# Patient Record
Sex: FEMALE | Race: WHITE | Employment: OTHER | URBAN - METROPOLITAN AREA
[De-identification: names, ages, dates, MRNs, and addresses within clinical notes are randomized per-mention and may not be internally consistent; named-entity substitution may affect disease eponyms.]

---

## 2017-01-02 ENCOUNTER — HOSPITAL ENCOUNTER (OUTPATIENT)
Dept: CARDIAC REHAB | Facility: CLINIC | Age: 67
End: 2017-01-02
Attending: INTERNAL MEDICINE
Payer: MEDICARE

## 2017-01-02 VITALS — WEIGHT: 183 LBS | BODY MASS INDEX: 33.46 KG/M2

## 2017-01-02 PROCEDURE — G0237 THERAPEUTIC PROCD STRG ENDUR: HCPCS

## 2017-01-02 PROCEDURE — G0238 OTH RESP PROC, INDIV: HCPCS

## 2017-01-02 PROCEDURE — 40000244 ZZH STATISTIC VISIT PULM REHAB

## 2017-01-05 ENCOUNTER — HOSPITAL ENCOUNTER (OUTPATIENT)
Dept: CARDIAC REHAB | Facility: CLINIC | Age: 67
End: 2017-01-05
Attending: INTERNAL MEDICINE
Payer: MEDICARE

## 2017-01-05 VITALS — BODY MASS INDEX: 33.28 KG/M2 | WEIGHT: 182 LBS

## 2017-01-05 PROCEDURE — 40000244 ZZH STATISTIC VISIT PULM REHAB

## 2017-01-05 PROCEDURE — G0239 OTH RESP PROC, GROUP: HCPCS

## 2017-01-12 ENCOUNTER — HOSPITAL ENCOUNTER (OUTPATIENT)
Dept: CARDIAC REHAB | Facility: CLINIC | Age: 67
End: 2017-01-12
Attending: INTERNAL MEDICINE
Payer: MEDICARE

## 2017-01-12 VITALS — BODY MASS INDEX: 33.35 KG/M2 | WEIGHT: 182.4 LBS

## 2017-01-12 PROCEDURE — 40000244 ZZH STATISTIC VISIT PULM REHAB

## 2017-01-12 PROCEDURE — G0239 OTH RESP PROC, GROUP: HCPCS

## 2017-01-16 ENCOUNTER — HOSPITAL ENCOUNTER (OUTPATIENT)
Dept: CARDIAC REHAB | Facility: CLINIC | Age: 67
End: 2017-01-16
Attending: INTERNAL MEDICINE
Payer: MEDICARE

## 2017-01-16 VITALS — HEIGHT: 62 IN | WEIGHT: 184 LBS | BODY MASS INDEX: 33.86 KG/M2

## 2017-01-16 PROCEDURE — 40000244 ZZH STATISTIC VISIT PULM REHAB

## 2017-01-16 PROCEDURE — G0239 OTH RESP PROC, GROUP: HCPCS

## 2017-01-16 ASSESSMENT — PULMONARY FUNCTION TESTS
FVC_PERCENT_PREDICTED: 45
FEV1 (%PREDICTED): 50
FVC: 1.24
FEV1: 1.04
FEV1/FVC: 76

## 2017-01-16 ASSESSMENT — DUKE ACTIVITY SCORE INDEX (DASI)
DASI METS SCORE: 3.63
VO2_PEAK: 12.7

## 2017-01-16 ASSESSMENT — 6 MINUTE WALK TEST (6MWT)
MALE CALC: 394.72
TOTAL DISTANCE WALKED: 122.56
GENDER SELECTION: FEMALE
FEMALE CALC: 414.76

## 2017-01-16 NOTE — PROGRESS NOTES
Randa Vazquez  66 year old  1950  Physician cosignature/electronic signature indicates approval of this ITP document. I have established, reviewed and made necessary changes to the individualized treatment plan and exercise prescription for this patient. 01/16/17 1500   Session   Session 60 Day Individualized Treatment Plan   Certified through this date 03/04/17   Type Reassessment   General Information   Treatment Diagnosis Idiopathic Pulmonary Fibrosis   Secondary Treatment Diagnosis COPD   Classification of COPD Stage 2 (Moderate)   Onset Date 08/01/09   Current Signs and Symptoms SOB;Fatigue   Outpatient Pulmonary Rehab Start Date 11/29/16   Primary Physician Bailey Jacobsen MD   Pulmonolgist [facility]   Medical History/Comorbidities   Medical History/Comorbidities Obstructive sleep apnea   Medical History Comments Past Medical History   Diagnosis Date     Pulmonary fibrosis (H)      Arthritis      Breast cancer (H)      hx;lumpectomy/radiation     Knee pain, right 05/2013     right knee arthroplasty related to arthritis     Obesity      Oxygen dependent      IPF vs radiation lung injury w/breast CA     Hypothyroidism      STANLEY (obstructive sleep apnea)      on CPAP      Sputum   Sputum Production Amount none   Tobacco History   Tobacco Former smoker   Tobacco Habit Cigarettes   Years Smoked 22   Average Packs Per Day 1   Quit Date or Planned Quit Date 08/01/90   Interventions Planned None: Patient is in maintenance   Medications   Long-Acting Beta Agonist Not prescribed   Short-Acting Beta Agonist Not prescribed   Long-Acting Anticholinergic Not prescribed   Short-Acting Anticholinergic Not prescribed   Inhaled Corticosteroid Not prescribed   Oral Corticosteroid Not prescribed   Medications Reconciled By Patient;Medical record   Preventative Vaccinations   Influenza Vaccination Yes   Pneumonia Vaccination Yes   Pain   Patient Currently in Pain No   Falls Screen   Have you fallen two or more times in  "the past year? No   Have you fallen and had an injury in the past year? No   Living and Work Status   Living Arrangements [facility] Live with an adult   Environmental Factors No concerns   ADL Limitations Bathing;Stair climbing   Initial Duke Activity Status Index (DASI) score. A measure of functional capacity. The goal is to have a pre-program raw score of 9.95 (~4 METs) or above 7.2   Initial DASI VO2 Peak (ml*kg-1*min-1) 12.7   Initial DASI MET Level 3.63   Occupation Retired   Return to Employment No   Physical Assessments   Edema None   Left Lung Sounds crackles   Right Lung Sounds crackles   Pulmonary Function Test (PFTs)   PFT Results Available   Date Completed 11/01/16   FVC Actual 1.24   % Predicted FVC 45   FEV1 Actual 1.04   % Predicted FEV1 50   FEV1/FEV Ratio 76   FRC Actual 84   Uncorrected DLCO 3.6   % Predicted Uncorrected DLCO 17   Pre/Post Bronchodilator Pre   Individualized Treatment Plan   Sessions Scheduled 18   Sessions Attended 11   Type Aerobic exercise;Resistance training   Oxygen Use   Supplemental Oxygen Needed Yes   Delivery Device Nasal Cannula   Liter Flow at Rest 4   Liter Flow with ADLS 5-6   Liter Flow During Exercise 8-10   Liter Flow With Sleep 4  (with CPAP)   Evaluated on Home System? (has the michael)   Interventions Recommended Attend education on home oxygen   Interventions Completed (has not attended when offered)   Exercise Prescription   Mode Treadmill;Nustep;Weights;Ambulation   Frequency 2 days/week   Duration/Time Intermittent bouts   THR (85% of age predicted max heart rate)  129.2   Effort Rating (0-10) 4-7/10   Progression of Exercise Start with bouts to 15\" then add 0.25 mets/week   Oxygen Titration with Exercise > 88% with exercise   Exercise Assessment   6 Minute Walk Predicted - Gender Selection Female   6 Minute Walk Predicted (Female) 414.76   6 Minute Walk Predicted (Male) 394.72   6 Minute Walk Distance (Initial) 122.56 Meters   Resting HR 92 " "  Resting /80   Exercise /85   SpO2 96   Exercise SpO2 77   Exercise Tolerance poor   Normal Limits Discussed Yes   Current Symptoms at Home Fatigue;Dyspnea   Current Symptoms in Rehab Dyspnea;Fatigue;Weakness   Limitations SOB. desats   Nutrition Management   Age 66   Height 1.575 m (5' 2.01\")   Weight 83.462 kg (184 lb)   BMI (Calculated) 33.72   Assessment Overweight   Goal weight (Pt has lost 30# in the last year. Pt happy at maintaining.)   Interventions Planned Attend group nutrition class   Interventions Completed (Pt did not attend education when offered.)   Psychosocial   Initial Patient Health Questionnaire -9 (PHQ-9) for depression. To notify physician if pre-score >9. 8   Initial [facility] Survey score. A quality of life measure. To re evaluate if total score is > 25. Also consider PHQ-9 and DASI to determine if patient should be referred back to physician. 29   Initial Shortness of Breath Questionnaire (SOBQ) score. The goal is to reduce the score pre to post program. 85   Intervention Planned Patient to identify 2-3 coping mechanisms to decrease stress and anxiety;Patient to attend appropriate education class;Develop and implement coping techniques;Use breathing techniques to control dyspnea cycle;Introduce relaxation techniques to assist with decreased anxiety;Recognize signs and symptoms of depression   Interventions Completed Demonstrated ability to apply breathing techniques to control dyspnea cycle   Psychosocial Comments 12/20/16 Pt is feeling good during this X mass season. 1/16/17 no changes . Coping as well as can be expected.   Stages of Change   Aerobic Exercise Contemplation   Physical Activity Contemplation   Recommended diet Action   Stress Preparation   Smoking Cessation Maintenance   Oxygen Usage Maintenance   Current Home Exercise   Type of Exercise None  (suggested chair aerobics DVD)   Recommended Home Exercise Prescription   Type of Exercise Walking;Mall walking "   Frequency (Days per week) 1-2   Duration (minutes per session) 15-30 min   Effort Rating Recommended (0-10) Scale  4-6/10   30 Day Exercise Plan 12/20/16 Encouraged pt to start chair exercises at home.Possibly purchase a DVD and hand wts. 1/16/17  Made same suggestion to get DVD. Pt reacted as if first time suggested.    Learning Assessment   Learner Patient   Primary Language English   Preferred Learning Style Listening;Reading;Demonstration   Barriers to Learning No barriers noted   Patient Education/Referrals   Education Recommended Exercise Principles;Home Oxygen Equipment;Energy Conservation;Anatomy and Disease Process;Breathing Techniques;Patient already attended in the past;Activities of Daily Living   Education Attended Energy Conservation;Breathing Techniques;Activities of Daily Living;Patient already attended in the past   Referral(s) Recommended None   Follow-up/On-going Support   Provider follow-up needed on the following No follow-up needed   Pulmonary Rehab Goals   Pulmonary Rehab Goals 1;2;3   Goal 1   Goal Pt to walk down her hallway in the house without holding onto the wall as she walks by starting an exercise program to gain strength and endurance .   Target Date 01/01/17   Progress Towards Goal 12/20/16 No change in behavior yet. Will do a TUG test to rule out balance issues 1/16/17 Pt still has same behavior but now has given more thought to attending the balance clinic in the future.   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   Target Date 01/01/17   Progress Towards Goal 12/20/16 Discussed energy conservation and PLB today.1/16/17 Review of PLB and practice of abdominal breathing and energy conservation. Gave handout on ADL's.   Goal 3   Goal Pt to walk to [facility] from car with less rest stops.   Target Date 02/02/17   Progress Towards Goal 12/20/16 Pt stopping 3 times on her way  in.1/16/17 Now at 2 rest stops.    Assessment   Assessment Pt is a 65 yo who has IPF since 2009. She was all set to start [place] in July when she was hospitalized for pneumonia for 4 days. She is now back and wants to gain stamina for better walking and easier ADL's. She is deconditioned and does not exercise at all. She has done NY in the past and would like to re learn the breathing techniques to help in daily life. 12/20/16 Pt has not started any home exercise. Encouraged her to do her clinic walk first thing on arrival but so far she has not. We have been turning the O2 up to 6-8 lpm oximizer for sessions. Her walking needs better pacing and I have demonstrated to her strategies to help with desaturation and fatigue. She has lost 3# since starting NY and she remains on the IPF drug OFEV.Will do a TUG test for balance next session.Pt would like to start chair exercises but does not have a good plan. Encouraged her to purchase a DVD.1/16/17 Updated ITP today. TUG test 17.7 seconds. Pt did not want the referral to the balance clinic but will keep it in mind. She has not purchased  or downloaded chair aerobics. She does not exercise at home. She has an alert button now in case of a fall. Went on a prednisone burst 2 weeks ago and  she states it didn't help her. We have been using 8-10 LPM with an oximyzer swapna for exercise. Will do a desat walk test closer to discharge and fax to Dr Mathew. Continue skilled care to monitor sat and increase intensity of workouts and continue to encourage a home exercise program.

## 2017-01-19 ENCOUNTER — HOSPITAL ENCOUNTER (OUTPATIENT)
Dept: CARDIAC REHAB | Facility: CLINIC | Age: 67
End: 2017-01-19
Attending: INTERNAL MEDICINE
Payer: MEDICARE

## 2017-01-19 VITALS — WEIGHT: 182.8 LBS | BODY MASS INDEX: 33.43 KG/M2

## 2017-01-19 PROCEDURE — G0239 OTH RESP PROC, GROUP: HCPCS

## 2017-01-19 PROCEDURE — 40000244 ZZH STATISTIC VISIT PULM REHAB

## 2017-01-23 ENCOUNTER — HOSPITAL ENCOUNTER (OUTPATIENT)
Dept: CARDIAC REHAB | Facility: CLINIC | Age: 67
End: 2017-01-23
Attending: INTERNAL MEDICINE
Payer: MEDICARE

## 2017-01-23 VITALS — BODY MASS INDEX: 33.39 KG/M2 | WEIGHT: 182.6 LBS

## 2017-01-23 PROCEDURE — G0239 OTH RESP PROC, GROUP: HCPCS

## 2017-01-23 PROCEDURE — 40000244 ZZH STATISTIC VISIT PULM REHAB

## 2017-01-26 ENCOUNTER — HOSPITAL ENCOUNTER (OUTPATIENT)
Dept: CARDIAC REHAB | Facility: CLINIC | Age: 67
End: 2017-01-26
Attending: INTERNAL MEDICINE
Payer: MEDICARE

## 2017-01-26 VITALS — WEIGHT: 182.6 LBS | BODY MASS INDEX: 33.39 KG/M2

## 2017-01-26 PROCEDURE — 40000244 ZZH STATISTIC VISIT PULM REHAB

## 2017-01-26 PROCEDURE — G0239 OTH RESP PROC, GROUP: HCPCS

## 2017-01-30 ENCOUNTER — HOSPITAL ENCOUNTER (OUTPATIENT)
Dept: CARDIAC REHAB | Facility: CLINIC | Age: 67
End: 2017-01-30
Attending: INTERNAL MEDICINE
Payer: MEDICARE

## 2017-01-30 VITALS — WEIGHT: 182.4 LBS | BODY MASS INDEX: 33.35 KG/M2

## 2017-01-30 PROCEDURE — 40000244 ZZH STATISTIC VISIT PULM REHAB

## 2017-01-30 PROCEDURE — G0239 OTH RESP PROC, GROUP: HCPCS

## 2017-02-06 ENCOUNTER — HOSPITAL ENCOUNTER (OUTPATIENT)
Dept: CARDIAC REHAB | Facility: CLINIC | Age: 67
End: 2017-02-06
Attending: INTERNAL MEDICINE
Payer: MEDICARE

## 2017-02-06 VITALS — BODY MASS INDEX: 33.21 KG/M2 | WEIGHT: 181.6 LBS

## 2017-02-06 PROCEDURE — G0239 OTH RESP PROC, GROUP: HCPCS

## 2017-02-06 PROCEDURE — 40000244 ZZH STATISTIC VISIT PULM REHAB

## 2017-02-13 ENCOUNTER — HOSPITAL ENCOUNTER (OUTPATIENT)
Dept: CARDIAC REHAB | Facility: CLINIC | Age: 67
End: 2017-02-13
Attending: INTERNAL MEDICINE
Payer: MEDICARE

## 2017-02-13 VITALS — WEIGHT: 181.4 LBS | BODY MASS INDEX: 33.17 KG/M2

## 2017-02-13 PROCEDURE — 40000244 ZZH STATISTIC VISIT PULM REHAB

## 2017-02-13 PROCEDURE — G0239 OTH RESP PROC, GROUP: HCPCS

## 2017-02-20 ENCOUNTER — HOSPITAL ENCOUNTER (OUTPATIENT)
Dept: CARDIAC REHAB | Facility: CLINIC | Age: 67
End: 2017-02-20
Attending: INTERNAL MEDICINE
Payer: MEDICARE

## 2017-02-20 VITALS — BODY MASS INDEX: 33.42 KG/M2 | HEIGHT: 62 IN | WEIGHT: 181.6 LBS

## 2017-02-20 PROCEDURE — G0238 OTH RESP PROC, INDIV: HCPCS

## 2017-02-20 PROCEDURE — 40000244 ZZH STATISTIC VISIT PULM REHAB

## 2017-02-20 ASSESSMENT — DUKE ACTIVITY SCORE INDEX (DASI)
DASI METS SCORE: 3.63
VO2_PEAK: 12.7
VO2_PEAK: 12.7
TOTAL_SCORE: 7.2
DASI METS SCORE: 3.63

## 2017-02-20 ASSESSMENT — 6 MINUTE WALK TEST (6MWT)
FEMALE CALC: 417.21
GENDER SELECTION: FEMALE
MALE CALC: 396.48
TOTAL DISTANCE WALKED: 122.56
TOTAL DISTANCE WALKED: 125

## 2017-02-20 ASSESSMENT — PULMONARY FUNCTION TESTS
FEV1 (%PREDICTED): 50
FVC_PERCENT_PREDICTED: 45
FVC: 1.24
FEV1: 1.04
FEV1/FVC: 76

## 2017-02-20 NOTE — PROGRESS NOTES
Randacheryl Vazquez  66 year old  1950  I have reviewed this patient s outcomes and self report of symptoms.  The patient has completed Pulmonary Rehabilitation and has made appropriate progress towards goals.  Please see individual treatment plan for details of attainment, discharge plan and independent exercise prescription. 02/20/17 1300   Session   Session Discharge Note   Type Discharge/Follow-up   General Information   Treatment Diagnosis Idiopathic Pulmonary Fibrosis   Secondary Treatment Diagnosis COPD   Classification of COPD Stage 2 (Moderate)   Onset Date 08/01/09   Current Signs and Symptoms SOB;Fatigue;Appetitie   Outpatient Pulmonary Rehab Start Date 11/29/16   Primary Physician Bailey Jacobsen MD   Pulmonolgist [facility]   Medical History/Comorbidities   Medical History/Comorbidities Obstructive sleep apnea   Medical History Comments Past Medical History   Diagnosis Date     Arthritis      Breast cancer (H)      hx;lumpectomy/radiation     Hypothyroidism      Knee pain, right 05/2013     right knee arthroplasty related to arthritis     Obesity      STANLEY (obstructive sleep apnea)      on CPAP     Oxygen dependent      IPF vs radiation lung injury w/breast CA     Pulmonary fibrosis (H)       Sputum   Sputum Production Amount none   Tobacco History   Tobacco Former smoker   Tobacco Habit Cigarettes   Years Smoked 22   Average Packs Per Day 1   Quit Date or Planned Quit Date 08/01/90   Interventions Planned None: Patient is in maintenance   Medications   Long-Acting Beta Agonist Not prescribed   Short-Acting Beta Agonist Not prescribed   Long-Acting Anticholinergic Not prescribed   Short-Acting Anticholinergic Not prescribed   Inhaled Corticosteroid Not prescribed   Oral Corticosteroid Not prescribed   Medications Reconciled By Patient;Medical record   Preventative Vaccinations   Influenza Vaccination Yes   Pneumonia Vaccination Yes   Pain   Patient Currently in Pain No   Falls Screen   Have you  "fallen two or more times in the past year? No   Have you fallen and had an injury in the past year? No   Living and Work Status   Living Arrangements [facility] Live with an adult   Environmental Factors No concerns   ADL Limitations Bathing;Stair climbing   Initial Duke Activity Status Index (DASI) score. A measure of functional capacity. The goal is to have a pre-program raw score of 9.95 (~4 METs) or above 7.2   Initial DASI VO2 Peak (ml*kg-1*min-1) 12.7   Initial DASI MET Level 3.63   Discharge DASI score 7.2   Discharge DASI VO2 Peak 12.7   Discharge DASI MET Level 3.63   Occupation Retired   Return to Employment No   Physical Assessments   Edema None   Left Lung Sounds crackles   Right Lung Sounds crackles   Pulmonary Function Test (PFTs)   PFT Results Available   Date Completed 11/01/16   FVC Actual 1.24   % Predicted FVC 45   FEV1 Actual 1.04   % Predicted FEV1 50   FEV1/FEV Ratio 76   FRC Actual 84   Uncorrected DLCO 3.6   % Predicted Uncorrected DLCO 17   Pre/Post Bronchodilator Pre   Individualized Treatment Plan   Sessions Scheduled 18   Sessions Attended 18   Type Aerobic exercise;Resistance training   Oxygen Use   Supplemental Oxygen Needed Yes   Delivery Device Nasal Cannula   Liter Flow at Rest 4   Liter Flow with ADLS 5-6   Liter Flow During Exercise 8-10   Liter Flow With Sleep 4  (with CPAP)   Evaluated on Home System? (has the marathon)   Interventions Recommended Attend education on home oxygen   Interventions Completed (has not attended when offered)   Exercise Prescription   Mode Treadmill;Nustep;Weights;Ambulation   Frequency 2 days/week   Duration/Time Intermittent bouts   THR (85% of age predicted max heart rate)  129.2   Effort Rating (0-10) 4-7/10   Progression of Exercise Start with bouts to 15\" then add 0.25 mets/week   Oxygen Titration with Exercise > 88% with exercise   Exercise Assessment   6 Minute Walk Predicted - Gender Selection Female   6 Minute Walk Predicted " "(Female) 417.21   6 Minute Walk Predicted (Male) 396.48   6 Minute Walk Distance (Initial) 122.56 Meters   6-min Walk Distance (Discharge) 125 Meters   Resting HR 75   Exercise    Resting /80   Exercise /78   SpO2 98   Exercise SpO2 84   Current MET level 2.8 mets   Exercise Tolerance poor   Normal Limits Discussed Yes   Current Symptoms at Home Fatigue;Dyspnea   Current Symptoms in Rehab Dyspnea;Fatigue;Weakness   Limitations SOB. desats   Nutrition Management   Age 66   Height 1.575 m (5' 2\")   Weight 82.4 kg (181 lb 9.6 oz)   BMI (Calculated) 33.28   Assessment Overweight   Goal weight (Pt has lost 30# in the last year. Pt happy at maintaining.)   Interventions Planned Attend group nutrition class   Interventions Completed (Pt did not attend education when offered.)   Psychosocial   Initial Patient Health Questionnaire -9 (PHQ-9) for depression. To notify physician if pre-score >9. 8   Initial [employer] [facility] Survey score. A quality of life measure. To re evaluate if total score is > 25. Also consider PHQ-9 and DASI to determine if patient should be referred back to physician. 29   Initial Shortness of Breath Questionnaire (SOBQ) score. The goal is to reduce the score pre to post program. 85   Discharge PHQ-9 for depression 5   Discharge [employer] Survey Score 27   Discharge SOBQ Score 72   Intervention Planned Patient to identify 2-3 coping mechanisms to decrease stress and anxiety;Patient to attend appropriate education class;Develop and implement coping techniques;Use breathing techniques to control dyspnea cycle;Introduce relaxation techniques to assist with decreased anxiety;Recognize signs and symptoms of depression   Interventions Completed Demonstrated ability to apply breathing techniques to control dyspnea cycle;Recognizes Signs and Symptoms of Depression   Psychosocial Comments 12/20/16 Pt is feeling good during this X mass season. 1/16/17 no changes . Coping as well as can be " expected.2/20/17 Some stress associated with her daughter and grandson moving out but she is enjoying when they visit.   Stages of Change   Aerobic Exercise Contemplation;Action   Physical Activity Contemplation;Action   Recommended diet Action   Stress Preparation;Maintenance   Smoking Cessation Maintenance   Oxygen Usage Maintenance   Current Home Exercise   Type of Exercise None  (suggested chair aerobics DVD, has not done the recommendation)   Recommended Home Exercise Prescription   Type of Exercise Walking;Mall walking   Frequency (Days per week) 1-2   Duration (minutes per session) 15-30 min   Effort Rating Recommended (0-10) Scale  4-6/10   Recommended Exercise Heart Rate Range (not given)   30 Day Exercise Plan 12/20/16 Encor aged pt to start chair exercises at home.Possibly purchase a DVD and hand wts. 1/16/17  Made same suggestion to get DVD. Pt reacted as if first time suggested. 2/20/17 10 minutes walking in the house/   Learning Assessment   Learner Patient   Primary Language English   Preferred Learning Style Listening;Reading;Demonstration   Barriers to Learning No barriers noted   Patient Education/Referrals   Education Recommended Exercise Principles;Home Oxygen Equipment;Energy Conservation;Anatomy and Disease Process;Breathing Techniques;Patient already attended in the past;Activities of Daily Living   Education Attended Energy Conservation;Breathing Techniques;Activities of Daily Living;Patient already attended in the past   Referral(s) Recommended None   Follow-up/On-going Support   Provider follow-up needed on the following No follow-up needed   Pulmonary Rehab Goals   Pulmonary Rehab Goals 1;2;3   Goal 1   Goal Pt to walk down her hallway in the house without holding onto the wall as she walks by starting an exercise program to gain strength and endurance .   Target Date 01/01/17   Date Met 02/20/17   Progress Towards Goal 12/20/16 No change in behavior yet. Will do a TUG test to rule out  balance issues 1/16/17 Pt still has same behavior but now has given more thought to attending the balance clinic in the future.2/20/17 Pt has met this goal. She is pacing much better when walking.   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   Target Date 01/01/17   Date Met 02/20/17   Progress Towards Goal 12/20/16 Discussed energy conservation and PLB today.1/16/17 Review of PLB and practice of abdominal breathing and energy conservation. Gave handout on ADL's.2/20/17 Reviewed abdominal breathing today to be used for relaxation.   Goal 3   Goal Pt to walk to [facility] from car with less rest stops.   Target Date 02/02/17   Date Met 02/20/17   Progress Towards Goal 12/20/16 Pt stopping 3 times on her way in.1/16/17 Now at 2 rest stops. 2/20/17 Pt is pacing her walk with results.   Assessment   Assessment Pt is a 65 yo who has IPF since 2009. She was all set to start [place] in July when she was hospitalized for pneumonia for 4 days. She is now back and wants to gain stamina for better walking and easier ADL's. She is deconditioned and does not exercise at all. She has done [place] in the past and would like to re learn the breathing techniques to help in daily life. 12/20/16 Pt has not started any home exercise. Angelina aged her to do her clinic walk first thing on arrival but so far she has not. We have been turning the O2 up to 6-8 lpm oximyzer for sessions. Her walking needs better pacing and I have demonstrated to her strategies to help with desaturation and fatigue. She has lost 3# since starting GA and she remains on the IPF drug OFEV.Will do a TUG test for balance next session.Pt would like to start chair exercises but does not have a good plan. Angelina aged her to purchase a DVD.1/16/17 Updated ITP today. TUG test 17.7 seconds. Pt did not want the referral to the balance clinic but will keep it in mind. She has not  purchased  or downloaded chair aerobics. She does not exercise at home. She has an alert button now in case of a fall. Went on a prednisone burst 2 weeks ago and  she states it didn't help her. We have been using 8-10 LPM with an oximyzer swapna for exercise. Will do a desat walk test closer to discharge and fax to Dr Mathew. Romain echevarria skilled care to monitor sat and increase intensity of workouts and continue to encourage a home exercise program.2/20/17 Pt is discharging today. She feels that [place] has been helpful even though there was only minor changes in her surveys for the better. She still desaturates while hare walking but has learned to pace herself by taking frequent rest stops. She now has a high flow CONC at home to facilitate higher O2 flow when needed, but continues on her Marathon Helios for portability. Also discussed the benefits of hospice care in the future and the need for Morphine for her air hunger.

## 2017-02-27 ENCOUNTER — ONCOLOGY VISIT (OUTPATIENT)
Dept: ONCOLOGY | Facility: CLINIC | Age: 67
End: 2017-02-27
Attending: INTERNAL MEDICINE
Payer: COMMERCIAL

## 2017-02-27 VITALS
BODY MASS INDEX: 33.53 KG/M2 | WEIGHT: 182.2 LBS | DIASTOLIC BLOOD PRESSURE: 83 MMHG | SYSTOLIC BLOOD PRESSURE: 126 MMHG | OXYGEN SATURATION: 96 % | TEMPERATURE: 98.3 F | RESPIRATION RATE: 16 BRPM | HEIGHT: 62 IN | HEART RATE: 99 BPM

## 2017-02-27 DIAGNOSIS — C50.311 MALIGNANT NEOPLASM OF LOWER-INNER QUADRANT OF RIGHT FEMALE BREAST (H): ICD-10-CM

## 2017-02-27 PROCEDURE — 99214 OFFICE O/P EST MOD 30 MIN: CPT | Performed by: INTERNAL MEDICINE

## 2017-02-27 RX ORDER — LETROZOLE 2.5 MG/1
2.5 TABLET, FILM COATED ORAL DAILY
Qty: 90 TABLET | Refills: 5 | Status: CANCELLED | OUTPATIENT
Start: 2017-02-27

## 2017-02-27 ASSESSMENT — PAIN SCALES - GENERAL: PAINLEVEL: NO PAIN (0)

## 2017-02-27 NOTE — PATIENT INSTRUCTIONS
Discontinue Femara  Follow up with primary care physician, annual mammogram and annual breast exam should be done at primary care office.

## 2017-02-27 NOTE — MR AVS SNAPSHOT
After Visit Summary   2/27/2017    Randa Vazquez    MRN: [id number]           Patient Information     Date Of Birth          1950        Visit Information        Provider Department      2/27/2017 2:30 PM Hazel King MD [facility]      Today's Diagnoses     Malignant neoplasm of lower-inner quadrant of right female breast (H)          Care Instructions    Discontinue Femara  Follow up with primary care physician, annual mammogram and annual breast exam should be done at primary care office.         Follow-ups after your visit        Who to contact     If you have questions or need follow up information about today's clinic visit or your schedule please contact [facility] directly at [phone].  Normal or non-critical lab and imaging results will be communicated to you by Fusebillhart, letter or phone within 4 business days after the clinic has received the results. If you do not hear from us within 7 days, please contact the clinic through Fusebillhart or phone. If you have a critical or abnormal lab result, we will notify you by phone as soon as possible.  Submit refill requests through charity: water or call your pharmacy and they will forward the refill request to us. Please allow 3 business days for your refill to be completed.          Additional Information About Your Visit        MyChart Information     charity: water gives you secure access to your electronic health record. If you see a primary care provider, you can also send messages to your care team and make appointments. If you have questions, please call your primary care clinic.  If you do not have a primary care provider, please call [phone] and they will assist you.        Care EveryWhere ID     This is your Care EveryWhere ID. This could be used by other organizations to access your [facility] medical records  [id number]        Your Vitals Were     Pulse Temperature  "Respirations Height Last Period Pulse Oximetry    99 98.3  F (36.8  C) (Tympanic) 16 1.575 m (5' 2\") 12/01/2001 96%    BMI (Body Mass Index)                   33.32 kg/m2            Blood Pressure from Last 3 Encounters:   02/27/17 126/83   08/14/16 154/88   02/15/16 (!) 142/99    Weight from Last 3 Encounters:   02/27/17 82.6 kg (182 lb 3.2 oz)   02/20/17 82.4 kg (181 lb 9.6 oz)   02/13/17 82.3 kg (181 lb 6.4 oz)              Today, you had the following     No orders found for display       Primary Care Provider Office Phone # Fax #    Bailey Jacobsen [phone]       [facility] [address] [place] [address]        Thank you!     Thank you for choosing [facility]  for your care. Our goal is always to provide you with excellent care. Hearing back from our patients is one way we can continue to improve our services. Please take a few minutes to complete the written survey that you may receive in the mail after your visit with us. Thank you!             Your Updated Medication List - Protect others around you: Learn how to safely use, store and throw away your medicines at www.disposemymeds.org.          This list is accurate as of: 2/27/17 11:59 PM.  Always use your most recent med list.                   Brand Name Dispense Instructions for use    ADVIL PO      Take 400 mg by mouth 3 times daily as needed for moderate pain       ARMOUR THYROID 60 MG tablet   Generic drug:  thyroid      Take 60 mg by mouth daily       aspirin 81 MG tablet      Take 81 mg by mouth daily       CENTRUM SILVER per tablet      Take 1 tablet by mouth daily       FIBER PLUS CALCIUM PO      Take 2 tablets by mouth daily       FLUoxetine 20 MG capsule    PROZAC    90 capsule    Take 1 capsule by mouth every morning.       letrozole 2.5 MG tablet    FEMARA    90 tablet    Take 1 tablet (2.5 mg) by mouth daily       OFEV 150 MG capsule   Generic drug:  nintedanib "      Take 150 mg by mouth daily       other medical supplies      Oxygen 4L       TRAMADOL HCL PO      Take  mg by mouth 2 times daily as needed for moderate to severe pain

## 2017-02-27 NOTE — PROGRESS NOTES
"[facility]  HEMATOLOGY ONCOLOGY    DIAGNOSES:   1.  Right breast infiltrating ductal carcinoma, stage I, grade 1, pathological T1c N0 Mx, ER/TX positive.  HER-2 testing was not performed on diagnosis in year 2009.   2.  Persistently elevated CA27.29.   3.  Chronic obstructive pulmonary disease, dependence on oxygen.      TREATMENT:   1.  In 07/2009 abnormal screening mammogram.  Biopsy showed ductal carcinoma in situ, proceeded with a right-sided wire localization and right partial lumpectomy and right axillary lymphadenectomy and sentinel node biopsy.  The patient had microinvasive intraductal carcinoma on the right side, 1 mm.   2.  Femara since 2009.      SUBJECTIVE:  Randa Vazquez comes in for followup today. She has been feeling well. On oxygen.      REVIEW OF SYSTEMS:  A complete review of systems was performed and found to be negative other than pertinent positives mentioned in history of present illness.      PHYSICAL EXAMINATION:   VITAL SIGNS:/83 (BP Location: Right arm, Patient Position: Chair, Cuff Size: Adult Large)  Pulse 99  Temp 98.3  F (36.8  C) (Tympanic)  Resp 16  Ht 1.575 m (5' 2\")  Wt 82.6 kg (182 lb 3.2 oz)  LMP 12/01/2001  SpO2 96%  BMI 33.32 kg/m2  GENERAL:  Sitting comfortably.   NEUROLOGIC:  Alert, awake.   BREAST: Right- No mass, nipple discharge or axillary adenopathy. Left- No mass, nipple discharge or axillary adenopathy.    DATA:  Reviewed    ASSESSMENT:  A 64-year-old lady with a stage I, ER/TX positive breast cancer.  Her last mammogram is without any evidence of recurrence and her physical exam does not have any significant abnormalities.  She has persistently elevated CA27.29 and CEA, which has been stable since a long time.  She also has evidence of osteopenia and has not tolerated Zometa previously.  She is also on prednisone long-term which is likely contributing to her osteopenia.  She continues to take calcium and vitamin D.   Mammogram in " 07/2016- benign.   She expressed her desire to stop Femara. I think it would be a reasonable approach. She had stage I tumor. She has completed more than 7 years of treatment. She has osteopenia now and may need bisphosphonate.     Plan:  Discontinue Femara  Follow up with primary care physician, annual mammogram and annual breast exam should be done at primary care office.     BISHOP GAUTHIER MD    2/27/2017    cc: Bailey Jacobsen CNP

## 2017-02-27 NOTE — NURSING NOTE
"Randa Vazquez is a 66 year old female who presents for:  Chief Complaint   Patient presents with     Oncology Clinic Visit     Malignant neoplasm of lower-inner quadrant of right female breast-Follow up        Initial Vitals:  Resp 16  Ht 1.575 m (5' 2\")  Wt 82.6 kg (182 lb 3.2 oz)  LMP 12/01/2001  BMI 33.32 kg/m2 Estimated body mass index is 33.32 kg/(m^2) as calculated from the following:    Height as of this encounter: 1.575 m (5' 2\").    Weight as of this encounter: 82.6 kg (182 lb 3.2 oz).. Body surface area is 1.9 meters squared. BP completed using cuff size: large  No Pain (0) Patient's last menstrual period was 12/01/2001. Allergies and medications reviewed.     Medications: Medication refills not needed today.  Pharmacy name entered into Gazzang:    [id number] [place], [place] - [address] [facility] [place], [place] - [address]    Comments: Follow-up  8 minutes for nursing intake (face to face time)   Nina Leahy  DISCHARGE PLAN:  Next appointments: See patient instruction section  Departure Mode: Ambulatory  Accompanied by: self  0 minutes for nursing discharge (face to face time)   Nina Leahy          "

## 2017-03-15 ENCOUNTER — HOSPITAL ENCOUNTER (OUTPATIENT)
Dept: LAB | Facility: CLINIC | Age: 67
Discharge: HOME | End: 2017-03-15
Attending: INTERNAL MEDICINE | Admitting: INTERNAL MEDICINE
Payer: MEDICARE

## 2017-03-15 DIAGNOSIS — J84.112: ICD-10-CM

## 2017-03-15 LAB
ALP SERPL-CCNC: 77 U/L (ref 40–150)
ALT SERPL W P-5'-P-CCNC: 22 U/L (ref 0–50)
AST SERPL W P-5'-P-CCNC: 33 U/L (ref 0–45)
BILIRUB SERPL-MCNC: 0.6 MG/DL (ref 0.2–1.3)

## 2017-03-15 PROCEDURE — 36415 COLL VENOUS BLD VENIPUNCTURE: CPT | Performed by: INTERNAL MEDICINE

## 2017-03-15 PROCEDURE — 84075 ASSAY ALKALINE PHOSPHATASE: CPT | Performed by: INTERNAL MEDICINE

## 2017-03-15 PROCEDURE — 82247 BILIRUBIN TOTAL: CPT | Performed by: INTERNAL MEDICINE

## 2017-03-15 PROCEDURE — 84450 TRANSFERASE (AST) (SGOT): CPT | Performed by: INTERNAL MEDICINE

## 2017-03-15 PROCEDURE — 84460 ALANINE AMINO (ALT) (SGPT): CPT | Performed by: INTERNAL MEDICINE

## 2017-05-02 DIAGNOSIS — J84.112 IDIOPATHIC PULMONARY FIBROSIS (H): Primary | ICD-10-CM

## 2017-05-05 ENCOUNTER — HOSPITAL ENCOUNTER (OUTPATIENT)
Dept: CT IMAGING | Facility: CLINIC | Age: 67
Discharge: HOME | End: 2017-05-05
Attending: INTERNAL MEDICINE | Admitting: INTERNAL MEDICINE
Payer: MEDICARE

## 2017-05-05 DIAGNOSIS — J84.112 IDIOPATHIC PULMONARY FIBROSIS (H): ICD-10-CM

## 2017-05-05 PROCEDURE — 71250 CT THORAX DX C-: CPT

## 2017-06-15 ENCOUNTER — HOSPITAL ENCOUNTER (OUTPATIENT)
Dept: LAB | Facility: CLINIC | Age: 67
Discharge: HOME | End: 2017-06-15
Attending: INTERNAL MEDICINE | Admitting: INTERNAL MEDICINE
Payer: MEDICARE

## 2017-06-15 DIAGNOSIS — J84.112 IDIOPATHIC PULMONARY FIBROSIS (H): ICD-10-CM

## 2017-06-15 LAB
ALBUMIN SERPL-MCNC: 3.3 G/DL (ref 3.4–5)
ALP SERPL-CCNC: 83 U/L (ref 40–150)
ALT SERPL W P-5'-P-CCNC: 19 U/L (ref 0–50)
AST SERPL W P-5'-P-CCNC: 24 U/L (ref 0–45)
BILIRUB DIRECT SERPL-MCNC: <0.1 MG/DL (ref 0–0.2)
BILIRUB SERPL-MCNC: 0.5 MG/DL (ref 0.2–1.3)
PROT SERPL-MCNC: 7.3 G/DL (ref 6.8–8.8)

## 2017-06-15 PROCEDURE — 80076 HEPATIC FUNCTION PANEL: CPT | Performed by: INTERNAL MEDICINE

## 2017-06-15 PROCEDURE — 36415 COLL VENOUS BLD VENIPUNCTURE: CPT | Performed by: INTERNAL MEDICINE

## 2017-07-01 ENCOUNTER — HEALTH MAINTENANCE LETTER (OUTPATIENT)
Age: 67
End: 2017-07-01

## 2017-07-24 ENCOUNTER — HOSPITAL ENCOUNTER (OUTPATIENT)
Dept: MAMMOGRAPHY | Facility: CLINIC | Age: 67
Discharge: HOME | End: 2017-07-24
Attending: NURSE PRACTITIONER | Admitting: NURSE PRACTITIONER
Payer: MEDICARE

## 2017-07-24 DIAGNOSIS — Z12.31 VISIT FOR SCREENING MAMMOGRAM: ICD-10-CM

## 2017-07-24 PROCEDURE — 77063 BREAST TOMOSYNTHESIS BI: CPT

## 2017-07-24 PROCEDURE — G0202 SCR MAMMO BI INCL CAD: HCPCS

## 2017-09-18 ENCOUNTER — HOSPITAL ENCOUNTER (OUTPATIENT)
Dept: LAB | Facility: CLINIC | Age: 67
Discharge: HOME | End: 2017-09-18
Attending: INTERNAL MEDICINE | Admitting: INTERNAL MEDICINE
Payer: MEDICARE

## 2017-09-18 DIAGNOSIS — J84.112 IDIOPATHIC PULMONARY FIBROSIS (H): Primary | ICD-10-CM

## 2017-09-18 LAB
ALBUMIN SERPL-MCNC: 2.9 G/DL (ref 3.4–5)
ALP SERPL-CCNC: 78 U/L (ref 40–150)
ALT SERPL W P-5'-P-CCNC: 11 U/L (ref 0–50)
AST SERPL W P-5'-P-CCNC: 16 U/L (ref 0–45)
BILIRUB DIRECT SERPL-MCNC: <0.1 MG/DL (ref 0–0.2)
BILIRUB SERPL-MCNC: 0.3 MG/DL (ref 0.2–1.3)
PROT SERPL-MCNC: 7 G/DL (ref 6.8–8.8)

## 2017-09-18 PROCEDURE — 36415 COLL VENOUS BLD VENIPUNCTURE: CPT | Performed by: INTERNAL MEDICINE

## 2017-09-18 PROCEDURE — 80076 HEPATIC FUNCTION PANEL: CPT | Performed by: INTERNAL MEDICINE

## 2017-11-07 ENCOUNTER — TRANSFERRED RECORDS (OUTPATIENT)
Dept: HEALTH INFORMATION MANAGEMENT | Facility: CLINIC | Age: 67
End: 2017-11-07

## 2017-12-12 DIAGNOSIS — J84.112 IPF (IDIOPATHIC PULMONARY FIBROSIS) (H): Primary | ICD-10-CM

## 2017-12-14 ENCOUNTER — HOSPITAL ENCOUNTER (OUTPATIENT)
Dept: LAB | Facility: CLINIC | Age: 67
Discharge: HOME | End: 2017-12-14
Attending: INTERNAL MEDICINE | Admitting: INTERNAL MEDICINE
Payer: MEDICARE

## 2017-12-14 DIAGNOSIS — J84.112 IPF (IDIOPATHIC PULMONARY FIBROSIS) (H): ICD-10-CM

## 2017-12-14 LAB
ALP SERPL-CCNC: 99 U/L (ref 40–150)
ALT SERPL W P-5'-P-CCNC: 24 U/L (ref 0–50)
AST SERPL W P-5'-P-CCNC: 67 U/L (ref 0–45)
BILIRUB SERPL-MCNC: 0.4 MG/DL (ref 0.2–1.3)

## 2017-12-14 PROCEDURE — 82247 BILIRUBIN TOTAL: CPT | Performed by: INTERNAL MEDICINE

## 2017-12-14 PROCEDURE — 36415 COLL VENOUS BLD VENIPUNCTURE: CPT | Performed by: INTERNAL MEDICINE

## 2017-12-14 PROCEDURE — 84075 ASSAY ALKALINE PHOSPHATASE: CPT | Performed by: INTERNAL MEDICINE

## 2017-12-14 PROCEDURE — 84460 ALANINE AMINO (ALT) (SGPT): CPT | Performed by: INTERNAL MEDICINE

## 2017-12-14 PROCEDURE — 84450 TRANSFERASE (AST) (SGOT): CPT | Performed by: INTERNAL MEDICINE

## 2017-12-22 DIAGNOSIS — J84.112 IDIOPATHIC PULMONARY FIBROSIS (H): Primary | ICD-10-CM

## 2018-01-01 ENCOUNTER — ADVANCED DIRECTIVES (OUTPATIENT)
Dept: HEALTH INFORMATION MANAGEMENT | Age: 68
End: 2018-01-01

## 2018-01-01 ENCOUNTER — OFFICE VISIT (OUTPATIENT)
Dept: FAMILY MEDICINE | Age: 68
End: 2018-01-01

## 2018-01-01 ENCOUNTER — TELEPHONE (OUTPATIENT)
Dept: SCHEDULING | Age: 68
End: 2018-01-01

## 2018-01-01 ENCOUNTER — TELEPHONE (OUTPATIENT)
Dept: FAMILY MEDICINE | Age: 68
End: 2018-01-01

## 2018-01-01 ENCOUNTER — APPOINTMENT (OUTPATIENT)
Dept: LAB | Age: 68
End: 2018-01-01

## 2018-01-01 VITALS
HEART RATE: 98 BPM | WEIGHT: 150.3 LBS | BODY MASS INDEX: 27.66 KG/M2 | RESPIRATION RATE: 36 BRPM | DIASTOLIC BLOOD PRESSURE: 96 MMHG | SYSTOLIC BLOOD PRESSURE: 142 MMHG | HEIGHT: 62 IN | OXYGEN SATURATION: 88 % | TEMPERATURE: 98.6 F

## 2018-01-01 DIAGNOSIS — E03.9 ACQUIRED HYPOTHYROIDISM: ICD-10-CM

## 2018-01-01 DIAGNOSIS — J84.10 INTERSTITIAL PULMONARY FIBROSIS (CMD): Primary | ICD-10-CM

## 2018-01-01 DIAGNOSIS — E03.9 ACQUIRED HYPOTHYROIDISM: Primary | ICD-10-CM

## 2018-01-01 DIAGNOSIS — J84.10 PULMONARY FIBROSIS (CMD): Primary | ICD-10-CM

## 2018-01-01 DIAGNOSIS — J84.112 IPF (IDIOPATHIC PULMONARY FIBROSIS) (CMD): Primary | ICD-10-CM

## 2018-01-01 DIAGNOSIS — Z51.5 HOSPICE CARE: ICD-10-CM

## 2018-01-01 LAB
T4 FREE SERPL-MCNC: 0.9 NG/DL (ref 0.8–1.5)
TSH SERPL-ACNC: 21.12 MCUNITS/ML (ref 0.35–5)

## 2018-01-01 PROCEDURE — 36415 COLL VENOUS BLD VENIPUNCTURE: CPT | Performed by: INTERNAL MEDICINE

## 2018-01-01 PROCEDURE — 99202 OFFICE O/P NEW SF 15 MIN: CPT | Performed by: FAMILY MEDICINE

## 2018-01-01 RX ORDER — THYROID 60 MG/1
60 TABLET ORAL DAILY
COMMUNITY
End: 2018-01-01 | Stop reason: DRUGHIGH

## 2018-01-01 RX ORDER — THYROID 90 MG/1
90 TABLET ORAL DAILY
Qty: 30 TABLET | Refills: 5 | Status: SHIPPED | OUTPATIENT
Start: 2018-01-01

## 2018-01-01 RX ORDER — FLUOXETINE HYDROCHLORIDE 40 MG/1
40 CAPSULE ORAL DAILY
COMMUNITY

## 2018-01-08 ENCOUNTER — HOSPITAL ENCOUNTER (OUTPATIENT)
Dept: LAB | Facility: CLINIC | Age: 68
Discharge: HOME | End: 2018-01-08
Attending: INTERNAL MEDICINE | Admitting: INTERNAL MEDICINE
Payer: MEDICARE

## 2018-01-08 DIAGNOSIS — J84.112 IDIOPATHIC PULMONARY FIBROSIS (H): ICD-10-CM

## 2018-01-08 LAB
ALP SERPL-CCNC: 74 U/L (ref 40–150)
ALT SERPL W P-5'-P-CCNC: 15 U/L (ref 0–50)
AST SERPL W P-5'-P-CCNC: 22 U/L (ref 0–45)
BILIRUB SERPL-MCNC: 0.4 MG/DL (ref 0.2–1.3)

## 2018-01-08 PROCEDURE — 82247 BILIRUBIN TOTAL: CPT | Performed by: INTERNAL MEDICINE

## 2018-01-08 PROCEDURE — 84450 TRANSFERASE (AST) (SGOT): CPT | Performed by: INTERNAL MEDICINE

## 2018-01-08 PROCEDURE — 36415 COLL VENOUS BLD VENIPUNCTURE: CPT | Performed by: INTERNAL MEDICINE

## 2018-01-08 PROCEDURE — 84075 ASSAY ALKALINE PHOSPHATASE: CPT | Performed by: INTERNAL MEDICINE

## 2018-01-08 PROCEDURE — 84460 ALANINE AMINO (ALT) (SGPT): CPT | Performed by: INTERNAL MEDICINE

## 2018-01-29 DIAGNOSIS — R09.02 HYPOXEMIA: ICD-10-CM

## 2018-01-29 DIAGNOSIS — R53.83 OTHER FATIGUE: ICD-10-CM

## 2018-01-29 DIAGNOSIS — R06.00 DYSPNEA, UNSPECIFIED: Primary | ICD-10-CM

## 2018-02-16 ENCOUNTER — HOSPITAL ENCOUNTER (OUTPATIENT)
Dept: LAB | Facility: CLINIC | Age: 68
Discharge: HOME | End: 2018-02-16
Attending: INTERNAL MEDICINE | Admitting: INTERNAL MEDICINE
Payer: MEDICARE

## 2018-02-16 DIAGNOSIS — R53.83 OTHER FATIGUE: ICD-10-CM

## 2018-02-16 DIAGNOSIS — R06.00 DYSPNEA, UNSPECIFIED: ICD-10-CM

## 2018-02-16 DIAGNOSIS — R09.02 HYPOXEMIA: ICD-10-CM

## 2018-02-16 LAB
ALP SERPL-CCNC: 84 U/L (ref 40–150)
ALT SERPL W P-5'-P-CCNC: 16 U/L (ref 0–50)
AST SERPL W P-5'-P-CCNC: 28 U/L (ref 0–45)
BILIRUB SERPL-MCNC: 0.5 MG/DL (ref 0.2–1.3)

## 2018-02-16 PROCEDURE — 82247 BILIRUBIN TOTAL: CPT | Performed by: INTERNAL MEDICINE

## 2018-02-16 PROCEDURE — 84460 ALANINE AMINO (ALT) (SGPT): CPT | Performed by: INTERNAL MEDICINE

## 2018-02-16 PROCEDURE — 84075 ASSAY ALKALINE PHOSPHATASE: CPT | Performed by: INTERNAL MEDICINE

## 2018-02-16 PROCEDURE — 84450 TRANSFERASE (AST) (SGOT): CPT | Performed by: INTERNAL MEDICINE

## 2018-02-16 PROCEDURE — 36415 COLL VENOUS BLD VENIPUNCTURE: CPT | Performed by: INTERNAL MEDICINE

## 2018-02-27 ENCOUNTER — TRANSFERRED RECORDS (OUTPATIENT)
Dept: HEALTH INFORMATION MANAGEMENT | Facility: CLINIC | Age: 68
End: 2018-02-27

## 2018-06-01 PROBLEM — Z51.5 HOSPICE CARE PATIENT: Status: ACTIVE | Noted: 2018-01-01

## 2018-06-01 PROBLEM — J84.112 IPF (IDIOPATHIC PULMONARY FIBROSIS) (CMD): Status: ACTIVE | Noted: 2018-01-01

## 2018-06-04 PROBLEM — E03.9 ACQUIRED HYPOTHYROIDISM: Status: ACTIVE | Noted: 2018-01-01

## 2018-06-08 ENCOUNTER — HISTORICAL DOCUMENTS (OUTPATIENT)
Dept: HEALTH INFORMATION MANAGEMENT | Age: 68
End: 2018-06-08

## 2020-02-08 ENCOUNTER — HEALTH MAINTENANCE LETTER (OUTPATIENT)
Age: 70
End: 2020-02-08